# Patient Record
Sex: MALE | URBAN - METROPOLITAN AREA
[De-identification: names, ages, dates, MRNs, and addresses within clinical notes are randomized per-mention and may not be internally consistent; named-entity substitution may affect disease eponyms.]

---

## 2023-01-16 ENCOUNTER — OFFICE VISIT (OUTPATIENT)
Dept: URBAN - METROPOLITAN AREA CLINIC 33 | Facility: CLINIC | Age: 63
End: 2023-01-16
Payer: MEDICARE

## 2023-01-16 DIAGNOSIS — H04.123 DRY EYE SYNDROME OF BILATERAL LACRIMAL GLANDS: ICD-10-CM

## 2023-01-16 DIAGNOSIS — H02.889 MEIBOMIAN GLAND DYSFUNCTION OF EYE: Primary | ICD-10-CM

## 2023-01-16 PROCEDURE — 99203 OFFICE O/P NEW LOW 30 MIN: CPT

## 2023-01-16 NOTE — IMPRESSION/PLAN
Impression: Meibomian gland dysfunction of eye: H02.889.
-3+ blepharitis OU
-does not know what drops he is using
-glaucoma managed by [facility] with visits every 3 months.
-wheelchair bound
-does not respond well to direction Plan: Patient educated on ocular findings. Recommended patient to use preservative free artificial tears 3-4x a day , perform warm compresses with digital massage once a day,  perform lid scrubs with baby shampoo/ocusoft BID OU, and to use gel drops at night. Return to clinic if patient notes worsening of vision or pain. Continue to monitor.